# Patient Record
Sex: MALE | Race: WHITE | Employment: OTHER | URBAN - METROPOLITAN AREA
[De-identification: names, ages, dates, MRNs, and addresses within clinical notes are randomized per-mention and may not be internally consistent; named-entity substitution may affect disease eponyms.]

---

## 2017-04-03 ENCOUNTER — TRANSFERRED RECORDS (OUTPATIENT)
Dept: HEALTH INFORMATION MANAGEMENT | Facility: CLINIC | Age: 72
End: 2017-04-03

## 2019-05-06 ENCOUNTER — TRANSFERRED RECORDS (OUTPATIENT)
Dept: HEALTH INFORMATION MANAGEMENT | Facility: CLINIC | Age: 74
End: 2019-05-06

## 2019-07-15 ENCOUNTER — TRANSFERRED RECORDS (OUTPATIENT)
Dept: HEALTH INFORMATION MANAGEMENT | Facility: CLINIC | Age: 74
End: 2019-07-15

## 2019-07-22 ENCOUNTER — TRANSFERRED RECORDS (OUTPATIENT)
Dept: HEALTH INFORMATION MANAGEMENT | Facility: CLINIC | Age: 74
End: 2019-07-22

## 2019-08-07 ENCOUNTER — TRANSFERRED RECORDS (OUTPATIENT)
Dept: HEALTH INFORMATION MANAGEMENT | Facility: CLINIC | Age: 74
End: 2019-08-07

## 2019-08-28 NOTE — TELEPHONE ENCOUNTER
ONCOLOGY INTAKE: Records Information      APPT INFORMATION:  Referring provider:  Dr Иван Shepherd  Referring provider s clinic:  [facility]  Reason for visit/diagnosis:  prostate cancer  Has patient been notified of appointment date and time?: yes    RECORDS INFORMATION:  Were the records received with the referral (via [employer])? no    Has patient been seen for any external appt for this diagnosis? yes    If yes, where? [facility]    Has patient had any imaging or procedures outside of [place]  [place] for this condition? yes      If Yes, where? [facility]     ADDITIONAL INFORMATION:  Patient is confirming that he has signed an CASEY

## 2019-08-29 NOTE — TELEPHONE ENCOUNTER
RECORDS STATUS - ALL OTHER DIAGNOSIS      RECORDS RECEIVED FROM: [facility]   DATE RECEIVED: 09/04/2019   NOTES STATUS DETAILS   OFFICE NOTE from referring provider YES CE   OFFICE NOTE from medical oncologist YES CE   2007   DISCHARGE SUMMARY from hospital NA    DISCHARGE REPORT from the ER NA    OPERATIVE REPORT NA    MEDICATION LIST YES CE   CLINICAL TRIAL TREATMENTS TO DATE NA    LABS YES CE   PATHOLOGY REPORTS YES TAKEN TO 5TH FLOOR   ANYTHING RELATED TO DIAGNOSIS NA    GENONOMIC TESTING NA    TYPE:     IMAGING (NEED IMAGES & REPORT) YES    CT SCANS NA    MRI YES    MAMMO NA    ULTRASOUND NA    PET NA

## 2019-09-03 NOTE — TELEPHONE ENCOUNTER
September 3, 2019  2:15 PM  Slides received from Jose Luis and sent to pathology (Case# [id number])

## 2019-09-04 ENCOUNTER — PRE VISIT (OUTPATIENT)
Facility: CLINIC | Age: 74
End: 2019-09-04

## 2019-09-04 ENCOUNTER — ONCOLOGY VISIT (OUTPATIENT)
Dept: ONCOLOGY | Facility: CLINIC | Age: 74
End: 2019-09-04
Attending: INTERNAL MEDICINE
Payer: COMMERCIAL

## 2019-09-04 VITALS
HEIGHT: 69 IN | OXYGEN SATURATION: 99 % | HEART RATE: 86 BPM | DIASTOLIC BLOOD PRESSURE: 98 MMHG | SYSTOLIC BLOOD PRESSURE: 134 MMHG | RESPIRATION RATE: 16 BRPM | WEIGHT: 160.05 LBS | TEMPERATURE: 97.6 F | BODY MASS INDEX: 23.71 KG/M2

## 2019-09-04 DIAGNOSIS — C61 PROSTATE CANCER (H): ICD-10-CM

## 2019-09-04 DIAGNOSIS — Z85.038 HISTORY OF MALIGNANT NEOPLASM OF LARGE INTESTINE: Primary | ICD-10-CM

## 2019-09-04 PROBLEM — H25.819 COMBINED FORMS OF AGE-RELATED CATARACT: Status: ACTIVE | Noted: 2019-02-01

## 2019-09-04 PROCEDURE — G0463 HOSPITAL OUTPT CLINIC VISIT: HCPCS | Mod: ZF

## 2019-09-04 PROCEDURE — 99205 OFFICE O/P NEW HI 60 MIN: CPT | Mod: ZP | Performed by: INTERNAL MEDICINE

## 2019-09-04 RX ORDER — ATORVASTATIN CALCIUM 10 MG/1
10 TABLET, FILM COATED ORAL
COMMUNITY
Start: 2018-12-13 | End: 2019-12-13

## 2019-09-04 ASSESSMENT — PAIN SCALES - GENERAL: PAINLEVEL: NO PAIN (0)

## 2019-09-04 ASSESSMENT — MIFFLIN-ST. JEOR: SCORE: 1449.76

## 2019-09-04 NOTE — LETTER
9/4/2019       RE: Eliezer Coombs  [address]  [place] [address]     Dear Colleague,    Thank you for referring your patient, Eliezer Coombs, to the [facility]. Please see a copy of my visit note below.    MEDICAL ONCOLOGY NEW PATIENT CLINIC NOTE    VISIT DATE: 09/04/2019      REFERRING PHYSICIANS:  Antonio Chapa DO, and Иван Shepherd MD, [facility], respectively      REASON FOR CONSULTATION:  Discussion of treatment options for localized prostate cancer.      HISTORY OF PRESENT ILLNESS:  Mr. Eliezer Coombs is a delightful, 73-year-old gentleman with a diagnosis of localized prostate cancer who is here for discussion of treatment options.  His oncologic history is detailed as under.  His wife accompanies him to this visit.      At this time, Lucius has no symptoms from his prostate cancer diagnosis and no signs or symptoms concerning for distant metastatic spread.  He recently met with Dr. Antonio Chapa with Radiation Oncology at [facility], who recommended getting started with external beam radiation therapy for favorable intermediate-risk disease.      ONCOLOGIC HISTORY:   1.  Prostate adenocarcinoma, localized, AUA favorable intermediate (cT1, [place] 3 + 4 = 7, PSA 6.4):  - 11/28/2016:  PSA found to be elevated at 4.7 and confirmed to be elevated at 5.9 on 01/13/2017 as well as 6.1 on 04/26/2017.   - 04/03/2017:  MRI prostate with and without IV contrast showed distended rectum with air and stool generating artifact-limiting evaluation of prostate.  No suspicious PI-RADS 4-5 lesions seen.  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   - 05/25/2017:  Prostate biopsies performed at [facility] by Dr. Shepherd showed  invasive adenocarcinoma, [place] 3 + 3 = 6 involving 20% of the left base as well as [place] 3 + 3 = 6 involving 10% of left mid, left apex, right base, right mid, right apex negative for malignancy.   - 05/2017-07/2019:  The patient on active surveillance per Dr. Shepherd.  PSA trends as follows:  10/23/2017, 4.4; 04/20/2018, 5.3; 10/29/2018, 4.7; 05/09/2019, 6.4.   - 07/15/2019:  Repeat prostate biopsy while on surveillance by Dr. Иван Shepherd at [facility] showed [place] 3 + 4 = 7 prostatic adenocarcinoma involving 20% of the left mid; [place] 3 + 4 = 7 adenocarcinoma with estimated 50% grade 4 disease involving 50% of tissue in left base, left apex, right apex, right mid and right base without any diagnostic abnormalities.   - 07-08/2019:  The patient seen by Dr. Иван Shepherd, who recommended against a radical prostatectomy because of his prior history of colonic resection for colon cancer.  He then saw Dr. Antonio Chapa with Radiation Oncology, who recommended a course of external beam radiation therapy without androgen deprivation therapy as a curative-intent option for his disease.      REVIEW OF SYSTEMS:  A 14 point review of systems is negative except for as above.      PAST MEDICAL HISTORY:   1.  Prostate cancer as above.   2.  History of stage II colon cancer, status post colonic resection in 1999.  The patient did not receive any chemotherapy back then.     3.  Cataracts.   4.  Rotator cuff tendinitis.      PAST SURGICAL HISTORY:   1.  Prostate biopsies as above.   2.  Colectomy.      FAMILY HISTORY:  No clustering of malignancies in the family.      SOCIAL HISTORY:  The patient lives in [place] with his wife, Adela.  He is a retired  and very healthy functionally.  He denies tobacco, alcohol or drug use.      ALLERGIES:     Allergies   Allergen Reactions     Amoxicillin-Pot Clavulanate Rash     Prednisone Hives     CURRENT MEDICATIONS:    Current Outpatient  Medications:      atorvastatin (LIPITOR) 10 MG tablet, Take 10 mg by mouth, Disp: , Rfl:      aspirin (ASA) 81 MG EC tablet, Take 81 mg by mouth, Disp: , Rfl:      PHYSICAL EXAMINATION:   VITAL SIGNS:  Afebrile.  Vital signs stable.   GENERAL:  Elderly, well-nourished gentleman in a chair in no acute distress.   HEENT:  Pupils equal and reactive to light and accommodation.  Extraocular movements intact.  Mucous membranes moist.   NECK:  Without any JVD or lymphadenopathy.   CHEST:  Good air entry bilaterally.  Normal work of breathing.   CARDIOVASCULAR:  S1, S2 normal.  No murmurs, rubs or gallops.   ABDOMEN:  Soft, nontender and nondistended.  Bowel sounds present.   MUSCULOSKELETAL:  Good range of motion of all extremities.  No clubbing, cyanosis or edema.   NEUROLOGIC:  Cranial nerves II-XII grossly intact.  No focal deficits.   PSYCHIATRIC:  Mood and affect normal.      LABORATORY, RADIOGRAPHIC AND PATHOLOGY DATA:  Pertinent outside the data reviewed and as above.      ASSESSMENT AND PLAN:  A 73-year-old gentleman with initially AUA low-risk prostate adenocarcinoma, who was placed on active surveillance, now with AUA favorable intermediate-risk disease here for discussion of treatment options.      I reviewed the available diagnostic data at length with the patient and his wife and explained that I agree with the strategy so far of active surveillance.      We spent considerable time discussing the nuances of the repeat prostate biopsy from 07/2019, which showed about 50% component of grade 4 adenocarcinoma.  However, he still has unilateral disease, at least on the limited core samples, and the PSA has not increased any further.  He does not have any clinical evidence of metastatic spread of his disease.      I discussed that curative intent of treatment with either a radical prostatectomy or external beam radiation therapy is a very reasonable option at this time given the disease progression.  I do agree with  Laila Shepherd and Sammi that in this gentleman with over 10 years of life expectancy and overall excellent functional status as well as minimal comorbidities, a curative-intent treatment now would perhaps offer a meaningful improvement in outcomes.      While the patient previously had what appears to be a stage II colon cancer, I explained that it may not necessary make a radical prostatectomy infeasible.  We discussed that if this surgery were to fail and the patient have residual or recurrent disease, that he had remained a candidate for salvage radiation therapy, whereas generally patients who start with radiation therapy as a primary treatment are not good candidates for a salvage prostatectomy.      I encouraged Lucius to have another conversation with his team at [facility] regarding the cure rate expected from primary radiation therapy as well as the possibility of a radical prostatectomy first.  If Dr. Chapa's team believes that there is an excellent chance of cure with radiation alone, then that would be a very reasonable option.  I agree that he would probably not benefit from the addition of androgen deprivation with the radiation therapy.      He is not a candidate for neoadjuvant systemic therapy, and we do not have any clinical trials for his disease subtype at this time.      The patient feels very comfortable with Dr. Shepherd and Dr. Chapa's care and wants to continue treatment with them.  I encouraged him to keep all scheduled appointments.      We will not be setting up a followup appointment in my clinic, but he was given contact information to reach out to me should a need arise in the future.      The patient and family were given the opportunity to ask questions, which were answered to their satisfaction.  They are in complete agreement with this plan.      I appreciate Dr. Chapa and Dr. Shepherd's referral to the clinic and will send them this note for review.      BILLIN         Warren Person M.D.  . Professor of Medicine  Genitourinary Oncology  Division of Hematology, Oncology & Transplantation  [facility]    cc:   Иван Shepherd MD   [facility]    [address] [facility] [place] [address]      Antonio hCapa DO   [facility]    [address] [place] [address]

## 2019-09-04 NOTE — PROGRESS NOTES
MEDICAL ONCOLOGY NEW PATIENT CLINIC NOTE    VISIT DATE: 09/04/2019      REFERRING PHYSICIANS:  Antonio Chapa DO, and Иван Shepherd MD, [facility], respectively      REASON FOR CONSULTATION:  Discussion of treatment options for localized prostate cancer.      HISTORY OF PRESENT ILLNESS:  Mr. Eliezer Coombs is a delightful, 73-year-old gentleman with a diagnosis of localized prostate cancer who is here for discussion of treatment options.  His oncologic history is detailed as under.  His wife accompanies him to this visit.      At this time, Lucius has no symptoms from his prostate cancer diagnosis and no signs or symptoms concerning for distant metastatic spread.  He recently met with Dr. Antonio Chapa with Radiation Oncology at [facility], who recommended getting started with external beam radiation therapy for favorable intermediate-risk disease.      ONCOLOGIC HISTORY:   1.  Prostate adenocarcinoma, localized, AUA favorable intermediate (cT1, [place] 3 + 4 = 7, PSA 6.4):  - 11/28/2016:  PSA found to be elevated at 4.7 and confirmed to be elevated at 5.9 on 01/13/2017 as well as 6.1 on 04/26/2017.   - 04/03/2017:  MRI prostate with and without IV contrast showed distended rectum with air and stool generating artifact-limiting evaluation of prostate.  No suspicious PI-RADS 4-5 lesions seen.  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   - 05/25/2017:  Prostate biopsies performed at [facility] by Dr. Shepherd showed invasive adenocarcinoma, Nic 3 + 3 = 6 involving 20% of the left base as well as [place] 3 + 3 = 6 involving 10% of left mid, left apex, right base, right mid, right apex negative for malignancy.   - 05/2017-07/2019:  The patient on  active surveillance per Dr. Shepherd.  PSA trends as follows:  10/23/2017, 4.4; 04/20/2018, 5.3; 10/29/2018, 4.7; 05/09/2019, 6.4.   - 07/15/2019:  Repeat prostate biopsy while on surveillance by Dr. Иван Shepherd at [facility] showed Nic 3 + 4 = 7 prostatic adenocarcinoma involving 20% of the left mid; [place] 3 + 4 = 7 adenocarcinoma with estimated 50% grade 4 disease involving 50% of tissue in left base, left apex, right apex, right mid and right base without any diagnostic abnormalities.   - 07-08/2019:  The patient seen by Dr. Иван Shepherd, who recommended against a radical prostatectomy because of his prior history of colonic resection for colon cancer.  He then saw Dr. Antonio Chapa with Radiation Oncology, who recommended a course of external beam radiation therapy without androgen deprivation therapy as a curative-intent option for his disease.      REVIEW OF SYSTEMS:  A 14 point review of systems is negative except for as above.      PAST MEDICAL HISTORY:   1.  Prostate cancer as above.   2.  History of stage II colon cancer, status post colonic resection in 1999.  The patient did not receive any chemotherapy back then.     3.  Cataracts.   4.  Rotator cuff tendinitis.      PAST SURGICAL HISTORY:   1.  Prostate biopsies as above.   2.  Colectomy.      FAMILY HISTORY:  No clustering of malignancies in the family.      SOCIAL HISTORY:  The patient lives in [place] with his wife, Adela.  He is a retired  and very healthy functionally.  He denies tobacco, alcohol or drug use.      ALLERGIES:     Allergies   Allergen Reactions     Amoxicillin-Pot Clavulanate Rash     Prednisone Hives     CURRENT MEDICATIONS:    Current Outpatient Medications:      atorvastatin (LIPITOR) 10 MG tablet, Take 10 mg by mouth, Disp: , Rfl:      aspirin (ASA) 81 MG EC tablet, Take 81 mg by mouth, Disp: , Rfl:      PHYSICAL EXAMINATION:   VITAL SIGNS:  Afebrile.  Vital signs stable.   GENERAL:   Elderly, well-nourished gentleman in a chair in no acute distress.   HEENT:  Pupils equal and reactive to light and accommodation.  Extraocular movements intact.  Mucous membranes moist.   NECK:  Without any JVD or lymphadenopathy.   CHEST:  Good air entry bilaterally.  Normal work of breathing.   CARDIOVASCULAR:  S1, S2 normal.  No murmurs, rubs or gallops.   ABDOMEN:  Soft, nontender and nondistended.  Bowel sounds present.   MUSCULOSKELETAL:  Good range of motion of all extremities.  No clubbing, cyanosis or edema.   NEUROLOGIC:  Cranial nerves II-XII grossly intact.  No focal deficits.   PSYCHIATRIC:  Mood and affect normal.      LABORATORY, RADIOGRAPHIC AND PATHOLOGY DATA:  Pertinent outside the data reviewed and as above.      ASSESSMENT AND PLAN:  A 73-year-old gentleman with initially AUA low-risk prostate adenocarcinoma, who was placed on active surveillance, now with AUA favorable intermediate-risk disease here for discussion of treatment options.      I reviewed the available diagnostic data at length with the patient and his wife and explained that I agree with the strategy so far of active surveillance.      We spent considerable time discussing the nuances of the repeat prostate biopsy from 07/2019, which showed about 50% component of grade 4 adenocarcinoma.  However, he still has unilateral disease, at least on the limited core samples, and the PSA has not increased any further.  He does not have any clinical evidence of metastatic spread of his disease.      I discussed that curative intent of treatment with either a radical prostatectomy or external beam radiation therapy is a very reasonable option at this time given the disease progression.  I do agree with Doctors Cora and Sammi that in this gentleman with over 10 years of life expectancy and overall excellent functional status as well as minimal comorbidities, a curative-intent treatment now would perhaps offer a meaningful improvement in  outcomes.      While the patient previously had what appears to be a stage II colon cancer, I explained that it may not necessary make a radical prostatectomy infeasible.  We discussed that if this surgery were to fail and the patient have residual or recurrent disease, that he had remained a candidate for salvage radiation therapy, whereas generally patients who start with radiation therapy as a primary treatment are not good candidates for a salvage prostatectomy.      I encouraged Lucius to have another conversation with his team at [facility] regarding the cure rate expected from primary radiation therapy as well as the possibility of a radical prostatectomy first.  If Dr. Chapa's team believes that there is an excellent chance of cure with radiation alone, then that would be a very reasonable option.  I agree that he would probably not benefit from the addition of androgen deprivation with the radiation therapy.      He is not a candidate for neoadjuvant systemic therapy, and we do not have any clinical trials for his disease subtype at this time.      The patient feels very comfortable with Dr. Shepherd and Dr. Chapa's care and wants to continue treatment with them.  I encouraged him to keep all scheduled appointments.      We will not be setting up a followup appointment in my clinic, but he was given contact information to reach out to me should a need arise in the future.      The patient and family were given the opportunity to ask questions, which were answered to their satisfaction.  They are in complete agreement with this plan.      I appreciate Dr. Chapa and Dr. Shepherd's referral to the clinic and will send them this note for review.      BILLIN        Warren Person M.D.  . Professor of Medicine  Genitourinary Oncology  Division of Hematology, Oncology & Transplantation  [facility]      cc:   Nathan B. Hoffman, MD [facility]    [address] [place] [address]      Antonio Chapa, DO   [facility]    [address] [place] [address]                    D: 2019   T: 2019   MT: catalina      Name:     EMMANUEL TIJERINA   MRN:      [id number]        Account:      [id number]   :      1945           Service Date: 2019      Document: [id number]

## 2019-09-04 NOTE — NURSING NOTE
"Oncology Rooming Note    September 4, 2019 11:12 AM   Eliezer Coombs is a 73 year old male who presents for:    Chief Complaint   Patient presents with     Oncology Clinic Visit     NEW;[facility] Prostate Ca. 2nd opinion     Initial Vitals: BP (!) 134/98   Pulse 86   Temp 97.6  F (36.4  C) (Oral)   Resp 16   Ht 1.742 m (5' 8.58\")   Wt 72.6 kg (160 lb 0.9 oz)   SpO2 99%   BMI 23.92 kg/m   Estimated body mass index is 23.92 kg/m  as calculated from the following:    Height as of this encounter: 1.742 m (5' 8.58\").    Weight as of this encounter: 72.6 kg (160 lb 0.9 oz). Body surface area is 1.87 meters squared.  No Pain (0) Comment: Data Unavailable   No LMP for male patient.  Allergies reviewed: Yes  Medications reviewed: Yes    Medications: Medication refills not needed today.  Pharmacy name entered into EPIC: Data Unavailable    Clinical concerns: results  Person was notified.      Gloria Pena MA              "

## 2019-09-05 LAB — COPATH REPORT: NORMAL

## 2019-09-05 PROCEDURE — 00000346 ZZHCL STATISTIC REVIEW OUTSIDE SLIDES TC 88321: Performed by: INTERNAL MEDICINE

## 2019-09-10 DIAGNOSIS — C61 PROSTATE CANCER (H): Primary | ICD-10-CM

## 2019-09-12 ENCOUNTER — PRE VISIT (OUTPATIENT)
Dept: UROLOGY | Facility: CLINIC | Age: 74
End: 2019-09-12

## 2019-09-12 NOTE — TELEPHONE ENCOUNTER
Reason for Visit: Consult    Diagnosis: Prostate cancer    Orders/Procedures/Records: Records available    Contact Patient: N/A    Rooming Requirements: Candido Lewis  09/12/19  12:18 PM

## 2019-10-03 ENCOUNTER — PRE VISIT (OUTPATIENT)
Dept: UROLOGY | Facility: CLINIC | Age: 74
End: 2019-10-03